# Patient Record
Sex: FEMALE | Race: WHITE | NOT HISPANIC OR LATINO | Employment: FULL TIME | URBAN - METROPOLITAN AREA
[De-identification: names, ages, dates, MRNs, and addresses within clinical notes are randomized per-mention and may not be internally consistent; named-entity substitution may affect disease eponyms.]

---

## 2020-09-24 ENCOUNTER — OFFICE VISIT (OUTPATIENT)
Dept: OBSTETRICS AND GYNECOLOGY | Facility: CLINIC | Age: 35
End: 2020-09-24
Payer: COMMERCIAL

## 2020-09-24 VITALS
HEIGHT: 67 IN | BODY MASS INDEX: 26.68 KG/M2 | DIASTOLIC BLOOD PRESSURE: 68 MMHG | WEIGHT: 170 LBS | SYSTOLIC BLOOD PRESSURE: 108 MMHG

## 2020-09-24 DIAGNOSIS — Z13.79 GENETIC SCREENING: ICD-10-CM

## 2020-09-24 DIAGNOSIS — Z12.4 ENCOUNTER FOR PAPANICOLAOU SMEAR FOR CERVICAL CANCER SCREENING: ICD-10-CM

## 2020-09-24 DIAGNOSIS — Z01.419 WELL WOMAN EXAM WITH ROUTINE GYNECOLOGICAL EXAM: Primary | ICD-10-CM

## 2020-09-24 PROCEDURE — 99395 PR PREVENTIVE VISIT,EST,18-39: ICD-10-PCS | Mod: S$GLB,,, | Performed by: OBSTETRICS & GYNECOLOGY

## 2020-09-24 PROCEDURE — 99395 PREV VISIT EST AGE 18-39: CPT | Mod: S$GLB,,, | Performed by: OBSTETRICS & GYNECOLOGY

## 2020-09-24 NOTE — PROGRESS NOTES
"  Subjective:      Patient ID: Harika Monteiro is a 35 y.o. female who presents for evaluation today.    Chief Complaint:    Well Woman    History of Present Illness  HPI  Annual Exam-Premenopausal  Patient presents for annual exam. The patient has no complaints today. The patient is sexually active. GYN screening history: last pap: patient does not recall results of last pap and patient does not recall when last pap was. The patient wears seatbelts: yes. The patient participates in regular exercise: no. Has the patient ever been transfused or tattooed?: yes. The patient reports that there is not domestic violence in her life.    GYN History  Patient's last menstrual period was 09/18/2020.   Date of Last Pap: Pap smear completed today with HPV co-testing Pap smear schedule reviewed with patient    VITALS  BP Readings from Last 1 Encounters:   09/24/20 108/68   Weight: 77.1 kg (170 lb)   Height: 5' 7" (170.2 cm)   BMI Readings from Last 1 Encounters:   09/24/20 26.63 kg/m²       FAMILY HISTORY  Family History   Problem Relation Age of Onset    Cancer Paternal Grandfather     Breast cancer Paternal Grandmother 81    Pancreatic cancer Maternal Grandmother        SOCIAL HISTORY  Social History     Tobacco Use   Smoking Status Never Smoker   ,   Social History     Substance and Sexual Activity   Alcohol Use Yes    Comment: socially        MEDICATIONS  No outpatient medications have been marked as taking for the 9/24/20 encounter (Office Visit) with Destiny Santos NP.       Review of Systems   Review of Systems   Constitutional: Negative for activity change, appetite change, chills, diaphoresis, fatigue, fever and unexpected weight change.   HENT: Negative for mouth sores.    Eyes: Negative for visual disturbance.   Respiratory: Negative for cough and shortness of breath.    Cardiovascular: Negative for chest pain, palpitations and leg swelling.   Gastrointestinal: Negative for abdominal pain, bloating, blood in " stool, constipation, diarrhea and nausea.   Endocrine: Negative for hair loss and hot flashes.   Genitourinary: Negative for dysmenorrhea, dysuria, menstrual problem, vaginal discharge, vaginal pain, urinary incontinence, vaginal dryness and vaginal odor.   Musculoskeletal: Negative for back pain and leg pain.   Integumentary:  Negative for rash, acne, hair changes, mole/lesion, breast mass, nipple discharge and breast skin changes.   Neurological: Negative for headaches.   Hematological: Negative for adenopathy. Does not bruise/bleed easily.   Psychiatric/Behavioral: Negative for depression and sleep disturbance. The patient is not nervous/anxious.    Breast: Positive for breast self exam.Negative for asymmetry, mass, mastodynia, nipple discharge and skin changes          Objective:     Physical Exam:   Constitutional: She is oriented to person, place, and time. She appears well-developed and well-nourished. She is cooperative. She does not appear ill. No distress.    HENT:   Head: Normocephalic and atraumatic.   Right Ear: External ear normal.   Left Ear: External ear normal.     Neck: Trachea normal and normal range of motion. Neck supple. No thyroid mass and no thyromegaly present.    Cardiovascular: Normal pulses.     Pulmonary/Chest: Effort normal. No stridor. No respiratory distress. Chest wall is not dull to percussion. She exhibits no mass, no bony tenderness, no laceration, no crepitus, no edema, no deformity, no swelling and no retraction. Right breast exhibits no inverted nipple, no mass, no nipple discharge, no skin change, no tenderness, presence, no bleeding and no swelling. Left breast exhibits no inverted nipple, no mass, no nipple discharge, no skin change, no tenderness, presence, no bleeding and no swelling. Breasts are symmetrical.        Abdominal: Soft. Normal appearance. She exhibits no distension. There is no abdominal tenderness. No hernia.     Genitourinary:    Vagina, uterus and rectum  normal.      Pelvic exam was performed with patient supine.   There is no rash, tenderness, lesion or injury on the right labia. There is no rash, tenderness, lesion or injury on the left labia. Cervix is normal. Right adnexum displays no mass, no tenderness and no fullness. Left adnexum displays no mass, no tenderness and no fullness. No tenderness or bleeding in the vagina.    No foreign body in the vagina.   Labial bartholins normal.Additional cervical findings: pap smear donenegative for vaginal discharge          Musculoskeletal: Normal range of motion and moves all extremeties.       Neurological: She is alert and oriented to person, place, and time.    Skin: Skin is warm and dry. No rash noted. She is not diaphoretic. No erythema. No pallor.    Psychiatric: She has a normal mood and affect. Her speech is normal and behavior is normal. Judgment and thought content normal.          Assessment:        1. Well woman exam with routine gynecological exam    2. Encounter for Papanicolaou smear for cervical cancer screening    3. Genetic screening         Plan:   Pap smear obtained today, will follow up with patient when results are received. Discussed with patient that based on her family history she is a candidate for genetic testing.  Discussed information to be obtained from the completion of the screening tests and the value it holds in regards to future planning for health promotion and maintenance.  Patient verbalizes desire to move forward with genetic screening.  Order given to patient today and instructed to follow up in 6 weeks for a discussion of the results. Annual labs ordered today.   Patient instructed to contact the clinic should any questions or concerns arise prior to her next office visit. Patient is happy with the plan of care at this time, verbalizes understanding and denies outstanding questions.

## 2020-09-24 NOTE — PATIENT INSTRUCTIONS
Breast Health: Breast Self-Awareness  What is breast self-awareness?  Breast self-awareness is knowing how your breasts normally look and feel. Your breasts change as you go through different stages of your life. So its important to learn what is normal for your breasts. Breast self-awareness helps you notice any changes in your breasts right away. Report any changes to your healthcare provider.  Why is breast self-awareness important?  Many experts now say that women should focus on breast self-awareness instead of doing a breast self-examination (BSE). These experts include the American Cancer Society, the U.S. Preventive Services Task Force, and the American Congress of Obstetricians and Gynecologists. Some experts even advise not teaching women to do a BSE. Thats because research hasnt shown a clear benefit to doing BSEs.  Breast self-awareness is different than a BSE. Breast self-awareness isnt about following a certain method and schedule. Its about knowing what's normal for your breasts. That way you can notice even small changes right away. If you see any changes, report them to your healthcare provider.  Changes to look for  Call your healthcare provider if you find any changes in your breasts that concern you. These changes may include:  · A lump  · Nipple discharge other than breast milk, especially a bloody discharge  · Swelling  · A change in size or shape  · Skin irritation, such as redness, thickening, or dimpling of the skin  · Swollen lymph nodes in the armpit  · Nipple problems, such as pain or redness  If you find a lump  Contact your provider if you find lumpiness in one breast, feel something different in the tissue, or feel a definite lump. Sometimes lumpiness may be due to menstrual changes. But there may be reason for concern.  Your provider may want to see you right away if you have:  · Nipple discharge that is bloody  · Skin changes on your breast, such as dimpling or puckering  Its  normal to be upset if you find a lump. But its important to contact your provider right away. Remember that most breast lumps are benign. This means they are not cancer.  Date Last Reviewed: 8/10/2015  © 7897-0373 [employer]. [address] [place] [address]. All rights reserved. This information is not intended as a substitute for professional medical care. Always follow your healthcare professional's instructions.          The Range of Pap Test Results  When your Pap test is sent to the lab, the lab studies your cell samples and reports any abnormal cell changes. Your health care provider can discuss these changes with you. In some cases, an abnormal Pap test is due to an infection. More serious cell changes range from dysplasia to cancer. Talk to your health care provider about your Pap test.    Normal results  Cervical cells, even normal ones, are always changing. As they mature, normal squamous cells move from deeper layers within the cervix. Over time, these cells flatten and cover the surface of the cervix. Within the cervical canal, the cells are different. These glandular cells are taller and not as flat as the cells on the surface of the cervix. When a Pap test sample shows healthy cells of both types, the results are negative. Keep having Pap tests as often as directed.  Abnormal results  A positive Pap test result means some cells in the sample showed abnormal changes. These results are grouped by the type of cell change and the location, or extent, of the changes. Depending on the results, you may need further testing.  · Inflammation: Noncancerous changes are present. They may be due to normal cell repair. Or, they may be caused by an infection, such as HPV or yeast. Further testing may be needed. (Also called reactive cellular changes.)  · Atypical squamous cells: Test results are unclear. Cells on the surface of the cervix show changes, but their significance is not yet  known. Testing for HPV and other sexually transmitted infections (STIs) may be needed. Treatment may be required. (Reported as ASC-US or ASC-H.)  · Atypical glandular cells: Cells lining the cervical canal show abnormal changes. Further testing is likely. You may also have treatment to destroy or remove problem cells. (Reported as AGC.)  · Mild dysplasia: Cells show distinct changes. More testing or HPV typing may be done. You may also have treatment to destroy or remove problem cells. (Reported as low-grade CHRISTINE or CINTHIA 1.)  · Moderate to severe dysplasia: Cells show precancerous changes. Or, noninvasive cancer (carcinoma in situ) may be present. Treatment to destroy or remove problem cells is likely. (Reported as high-grade CHRISTINE or CINTHIA 2 or CINTHIA 3.)  · Cancer: Different types of cancer may be detected by your Pap test. More tests to assess the cancer's extent are likely. The type of treatment will depend on the test results and other factors, such as age and health history. (Reported as squamous cell carcinoma, endocervical adenocarcinoma in situ, or adenocarcinoma.)  Date Last Reviewed: 5/12/2015  © 4289-2883 [employer]. [address] [place] [address]. All rights reserved. This information is not intended as a substitute for professional medical care. Always follow your healthcare professional's instructions.          When You Have an Abnormal Pap Test    The Pap test is a screening test that checks for cell changes in the cervix, the opening of the uterus. In some cases, it checks for a virus that can cause cervical cancer. If your Pap results were abnormal, you may be worried. But there is no reason to panic. An abnormal Pap test result can mean many things. It may be due to changes (inflammation) caused by normal cell repair or infection. Or you may have a problem called dysplasia that could become cervical cancer. If so, know that dysplasia tends to progress very slowly before becoming  cervical cancer. Thats why its so important to have Pap tests as often as directed. Pap tests can show cell changes in the cervix early, when treatment is most effective.  Talk to your health care provider  Be sure to discuss your results with your health care provider. Find out about any follow-up tests youll need. You may be asked to come back for a second Pap test in a few months. Or, you may be scheduled for an exam so your health care provider can get a closer look at your cervix. In either case, be sure to keep your follow-up visits. They are one of your best safeguards against future problems.  Understanding your risk  Some lifestyle choices can increase your risk of abnormal cell changes. Did you start having sex at a young age? Have you had many sexual partners? Have you had sex without using a latex condom? Do you smoke? If you answered yes to any of these questions, you are more at risk. One of the most common reasons for an abnormal Pap result is infection with the human papillomavirus (HPV). If your Pap results suggest HPV, further testing may be needed.     The Pap test  During the test:  · An instrument called a speculum is inserted into the vagina to hold it open. This lets your health care provider see the cervix.  · A small brush or swab is used to take cells from several areas of the cervix. The cells are put into a liquid or on a slide. They are then sent to a lab where they are studied for changes. Your health care provider will contact you with the results.   Date Last Reviewed: 4/26/2015  © 0854-8168 [employer]. [address] [place] [address], [place] [address]. All rights reserved. This information is not intended as a substitute for professional medical care. Always follow your healthcare professional's instructions.

## 2020-09-28 ENCOUNTER — TELEPHONE (OUTPATIENT)
Dept: OBSTETRICS AND GYNECOLOGY | Facility: CLINIC | Age: 35
End: 2020-09-28

## 2020-09-28 NOTE — TELEPHONE ENCOUNTER
Pt called, no answer noted. VM left regarding pt normal lab, and to encouraged pt to sign up for the portal.

## 2020-09-28 NOTE — TELEPHONE ENCOUNTER
----- Message from Destiny Santos NP sent at 9/28/2020 11:09 AM CDT -----  Regarding: please call   Please call patient and teller her recent blood work looks good.  [facility]

## 2020-09-30 ENCOUNTER — TELEPHONE (OUTPATIENT)
Dept: OBSTETRICS AND GYNECOLOGY | Facility: CLINIC | Age: 35
End: 2020-09-30

## 2020-09-30 NOTE — TELEPHONE ENCOUNTER
----- Message from Destiny Santos NP sent at 9/29/2020 11:25 AM CDT -----  Regarding: Pap results  Please call patient within 24-48 hrs and let them know their pap smear results were normal. Thank you!    -Destiny Santos NP

## 2020-10-29 ENCOUNTER — TELEPHONE (OUTPATIENT)
Dept: OBSTETRICS AND GYNECOLOGY | Facility: CLINIC | Age: 35
End: 2020-10-29

## 2020-10-29 NOTE — TELEPHONE ENCOUNTER
----- Message from Ele Boucher sent at 10/29/2020  9:38 AM CDT -----  Type:  Patient Returning Call    Who Called:pt   Who Left Message for Patient:na  Does the patient know what this is regarding?:results   Would the patient rather a call back or a response via [facility]? Callback   Best Call Back Number:[phone]  Additional Information:

## 2020-11-18 ENCOUNTER — OFFICE VISIT (OUTPATIENT)
Dept: OBSTETRICS AND GYNECOLOGY | Facility: CLINIC | Age: 35
End: 2020-11-18
Payer: COMMERCIAL

## 2020-11-18 VITALS
SYSTOLIC BLOOD PRESSURE: 110 MMHG | HEIGHT: 67 IN | DIASTOLIC BLOOD PRESSURE: 60 MMHG | BODY MASS INDEX: 27.53 KG/M2 | WEIGHT: 175.38 LBS

## 2020-11-18 DIAGNOSIS — Z13.79 GENETIC SCREENING: Primary | ICD-10-CM

## 2020-11-18 PROCEDURE — 3008F BODY MASS INDEX DOCD: CPT | Mod: CPTII,S$GLB,, | Performed by: OBSTETRICS & GYNECOLOGY

## 2020-11-18 PROCEDURE — 99213 OFFICE O/P EST LOW 20 MIN: CPT | Mod: S$GLB,,, | Performed by: OBSTETRICS & GYNECOLOGY

## 2020-11-18 PROCEDURE — 99213 PR OFFICE/OUTPT VISIT, EST, LEVL III, 20-29 MIN: ICD-10-PCS | Mod: S$GLB,,, | Performed by: OBSTETRICS & GYNECOLOGY

## 2020-11-18 PROCEDURE — 3008F PR BODY MASS INDEX (BMI) DOCUMENTED: ICD-10-PCS | Mod: CPTII,S$GLB,, | Performed by: OBSTETRICS & GYNECOLOGY

## 2020-11-18 NOTE — PROGRESS NOTES
"  Subjective:      Patient ID: Harika Monteiro is a 35 y.o. female who presents for evaluation today.    Chief Complaint:  genetic results      History of Present Illness:  Genetic Screening - Patient presents to the clinic today for a discussion of her recent genetic testing results.    VITALS  BP Readings from Last 1 Encounters:   11/18/20 110/60   Weight: 79.6 kg (175 lb 6.4 oz)   Height: 5' 7" (170.2 cm)   BMI Readings from Last 1 Encounters:   11/18/20 27.47 kg/m²     No LMP recorded. (Menstrual status: Other).     MEDICATIONS  No outpatient medications have been marked as taking for the 11/18/20 encounter (Office Visit) with Destiny Santos NP.         Review of Systems   Review of Systems   Constitutional: Negative for chills and fever.   HENT: Negative for tinnitus.    Eyes: Negative for visual disturbance.   Respiratory: Negative for cough, shortness of breath and wheezing.    Cardiovascular: Negative for chest pain, palpitations and leg swelling.   Gastrointestinal: Negative for blood in stool, vomiting and fecal incontinence.   Genitourinary: Negative for dysuria, flank pain, frequency, hematuria, urgency, vaginal discharge, vaginal pain and urinary incontinence.   Musculoskeletal: Negative for arthralgias and joint swelling.   Integumentary:  Negative for rash, acne, hair changes, mole/lesion, breast mass, nipple discharge and breast skin changes.   Neurological: Negative for seizures and numbness.   Hematological: Negative for adenopathy. Does not bruise/bleed easily.   Psychiatric/Behavioral: Negative for sleep disturbance.   Breast: Negative for mass, mastodynia, nipple discharge and skin changes         Objective:     Physical Exam:   Constitutional: She is oriented to person, place, and time. She appears well-developed and well-nourished. No distress.    HENT:   Head: Normocephalic and atraumatic.   Right Ear: External ear normal.   Left Ear: External ear normal.     Neck: Normal range of motion. " Neck supple. No tracheal deviation present. No thyromegaly present.     Pulmonary/Chest: Effort normal. No respiratory distress.        Abdominal: Soft. She exhibits no distension.             Musculoskeletal: Normal range of motion and moves all extremeties.       Neurological: She is alert and oriented to person, place, and time.    Skin: Skin is warm and dry. No rash noted. She is not diaphoretic. No erythema. No pallor.    Psychiatric: She has a normal mood and affect. Her behavior is normal. Judgment and thought content normal.          Assessment:      1. Genetic screening         Plan:   The genetic testing results were discussed with the patient today, along with the plan of care from this point forward. A copy of the patient's results and supplemental information regarding genetic testing and the individualized plan of care were given to the patient today. Patient verbalized understanding and denies additional questions at this time. Patient is happy with the plan of care at this time, verbalizes understanding and denies outstanding questions.

## 2020-11-20 ENCOUNTER — TELEPHONE (OUTPATIENT)
Dept: OBSTETRICS AND GYNECOLOGY | Facility: CLINIC | Age: 35
End: 2020-11-20

## 2020-11-20 NOTE — TELEPHONE ENCOUNTER
----- Message from Destiny Santos NP sent at 11/19/2020  3:07 PM CST -----  Regarding: please call   Please call patient and tell her we were following up from her visit yesterday and wanted to know that her lifetime risk score for breast cancer is within normal limits (if she asks it was 14.8%)

## 2022-03-31 LAB
HUMAN PAPILLOMAVIRUS (HPV): NORMAL
HUMAN PAPILLOMAVIRUS (HPV): NORMAL
PAP RECOMMENDATION EXT: NORMAL

## 2022-07-18 ENCOUNTER — PATIENT OUTREACH (OUTPATIENT)
Dept: ADMINISTRATIVE | Facility: HOSPITAL | Age: 37
End: 2022-07-18
Payer: COMMERCIAL